# Patient Record
Sex: MALE | Race: WHITE | NOT HISPANIC OR LATINO | URBAN - METROPOLITAN AREA
[De-identification: names, ages, dates, MRNs, and addresses within clinical notes are randomized per-mention and may not be internally consistent; named-entity substitution may affect disease eponyms.]

---

## 2024-01-21 ENCOUNTER — OFFICE VISIT (OUTPATIENT)
Dept: URGENT CARE | Facility: CLINIC | Age: 54
End: 2024-01-21
Payer: COMMERCIAL

## 2024-01-21 VITALS
HEART RATE: 69 BPM | OXYGEN SATURATION: 96 % | SYSTOLIC BLOOD PRESSURE: 145 MMHG | DIASTOLIC BLOOD PRESSURE: 88 MMHG | TEMPERATURE: 98 F | RESPIRATION RATE: 18 BRPM

## 2024-01-21 DIAGNOSIS — S69.91XA INJURY OF RIGHT THUMB, INITIAL ENCOUNTER: ICD-10-CM

## 2024-01-21 DIAGNOSIS — T14.8XXA SUPERFICIAL LACERATION: Primary | ICD-10-CM

## 2024-01-21 PROCEDURE — 90715 TDAP VACCINE 7 YRS/> IM: CPT | Performed by: PHYSICIAN ASSISTANT

## 2024-01-21 PROCEDURE — 99204 OFFICE O/P NEW MOD 45 MIN: CPT | Performed by: PHYSICIAN ASSISTANT

## 2024-01-21 PROCEDURE — 90471 IMMUNIZATION ADMIN: CPT | Performed by: PHYSICIAN ASSISTANT

## 2024-01-21 RX ORDER — METOPROLOL SUCCINATE 50 MG/1
50 TABLET, EXTENDED RELEASE ORAL DAILY
COMMUNITY

## 2024-01-21 RX ORDER — PHENYTOIN SODIUM 100 MG/1
200 CAPSULE, EXTENDED RELEASE ORAL 2 TIMES DAILY
COMMUNITY

## 2024-01-21 RX ORDER — LISINOPRIL 10 MG/1
10 TABLET ORAL DAILY
COMMUNITY

## 2024-01-21 ASSESSMENT — ENCOUNTER SYMPTOMS
FEVER: 0
WOUND: 1
CHILLS: 0
NUMBNESS: 0

## 2024-01-21 NOTE — PROGRESS NOTES
"Subjective   Patient ID: Rigo Peace \"Marii" is a 53 y.o. male who presents for Finger Injury (Cut right 5th digit and concerned for rust in wound. ).  Patient notes that he caught the finger on a trailer hitch that was quite g[place] he is concerned about need of tetanus immunization.  The wound itself is quite superficial.  There is no bleeding at time of exam.  The injury occurred this morning.  Patient also has a secondary complaint of smashed right thumb from a month ago.  He has some ecchymosis under the nail but otherwise no significant pain at the site.        Review of Systems   Constitutional:  Negative for chills and fever.   Skin:  Positive for wound.   Neurological:  Negative for numbness.       Objective   /88   Pulse 69   Temp 36.7 °C (98 °F)   Resp 18   SpO2 96%   Physical Exam  Constitutional:       Appearance: Normal appearance.   Cardiovascular:      Rate and Rhythm: Normal rate and regular rhythm.      Pulses:           Radial pulses are 2+ on the right side and 2+ on the left side.   Musculoskeletal:         General: Signs of injury present. No swelling or tenderness.      Comments: There is no significant tenderness with palpation over the distal phalanx of the right thumb   Skin:     Comments: superficial laceration overlying the proximal interphalangeal joint of the right fifth digit.  Does not appear contaminated.  As for the patient's right thumb there is small amount of ecchymosis under the right first digit nail without significant subungual hematoma   Neurological:      General: No focal deficit present.      Mental Status: He is alert.      Sensory: No sensory deficit.         Assessment/Plan   Problem List Items Addressed This Visit       Superficial laceration - Primary    Injury of right thumb   -The superficial laceration to the right fifth digit was cleaned with Betadine and bandaged.  The patient received Tdap today.    -As for the injury to the right thumb this occurred " roughly a month ago.  Patient provided cage type splint to protect the distal right thumb but there is no tenderness along the proximal thumb I discussed with patient if there is a fracture of the distal phalanx of the right thumb treatment would essentially be protecting this until it is healed and therefore patient provided the cage splint.  At this point obtaining radiographs is unlikely to change the clinical approach

## 2024-01-21 NOTE — PATIENT INSTRUCTIONS
Assessment/Plan   Problem List Items Addressed This Visit       Superficial laceration - Primary    Injury of right thumb   -The superficial laceration to the right fifth digit was cleaned with Betadine and bandaged.  The patient received Tdap today.    -As for the injury to the right thumb this occurred roughly a month ago.  Patient provided cage type splint to protect the distal right thumb but there is no tenderness along the proximal thumb I discussed with patient if there is a fracture of the distal phalanx of the right thumb treatment would essentially be protecting this until it is healed and therefore patient provided the cage splint.  At this point obtaining radiographs is unlikely to change the clinical approach